# Patient Record
Sex: MALE | Race: WHITE | NOT HISPANIC OR LATINO | Employment: FULL TIME | URBAN - NONMETROPOLITAN AREA
[De-identification: names, ages, dates, MRNs, and addresses within clinical notes are randomized per-mention and may not be internally consistent; named-entity substitution may affect disease eponyms.]

---

## 2022-12-15 ENCOUNTER — APPOINTMENT (EMERGENCY)
Dept: RADIOLOGY | Facility: HOSPITAL | Age: 22
End: 2022-12-15

## 2022-12-15 ENCOUNTER — HOSPITAL ENCOUNTER (EMERGENCY)
Facility: HOSPITAL | Age: 22
Discharge: HOME | End: 2022-12-15
Attending: EMERGENCY MEDICINE

## 2022-12-15 VITALS
WEIGHT: 180 LBS | SYSTOLIC BLOOD PRESSURE: 147 MMHG | OXYGEN SATURATION: 99 % | DIASTOLIC BLOOD PRESSURE: 96 MMHG | BODY MASS INDEX: 26.66 KG/M2 | RESPIRATION RATE: 17 BRPM | HEART RATE: 88 BPM | TEMPERATURE: 98.4 F | HEIGHT: 69 IN

## 2022-12-15 DIAGNOSIS — M77.9 TENDINITIS: Primary | ICD-10-CM

## 2022-12-15 NOTE — Clinical Note
Kleber George was seen and treated in our emergency department on 12/15/2022  Diagnosis:     Hal Seek  may return to work on return date  He may return on this date: 12/19/2022    Not to use left hand until cleared by work physician     If you have any questions or concerns, please don't hesitate to call        Jami Wan, DO    ______________________________           _______________          _______________  Hospital Representative                              Date                                Time

## 2022-12-16 NOTE — DISCHARGE INSTRUCTIONS
Return immediately if worse or any new symptoms  Tylenol 1000 mg every 6 hours as needed  and/or  Advil 400 mg every 6 hours as needed  May take both together  Ice 20 minutes hourly as needed  Use splint until seen by work physician or sports medicine physician listed  Please arrange visit with work physician within 24 hours

## 2022-12-16 NOTE — ED PROVIDER NOTES
History  Chief Complaint   Patient presents with   • Arm Pain     Pt was lifting boxes and is now having left lower forearm pain and swelling  Ptlast took Tylenol at 0900 today with not much relief  36-year-old male describes left mid posterior distal forearm pain and swelling that began yesterday at work where he lifts heavy items, 100 pounds, stabilizes with left hand supinated  No prior injury or pain  No prior swelling  Unamenable to Tylenol this morning  No other complaints  History provided by:  Patient  Arm Pain  Location:  Left  Quality:  Ache, sharp  Onset quality:  Gradual  Timing:  Constant  Progression:  Unchanged  Chronicity:  New  Context:  Heavy lifting, stabilizing  Worsened by: Movement, palpation  Ineffective treatments:  Tylenol  Associated symptoms comment:  No numbness or weakness in left hand      None       History reviewed  No pertinent past medical history  History reviewed  No pertinent surgical history  History reviewed  No pertinent family history  I have reviewed and agree with the history as documented  E-Cigarette/Vaping   • E-Cigarette Use Never User      E-Cigarette/Vaping Substances     Social History     Tobacco Use   • Smoking status: Never   • Smokeless tobacco: Never   Vaping Use   • Vaping Use: Never used   Substance Use Topics   • Alcohol use: Never   • Drug use: Never       Review of Systems   All other systems reviewed and are negative  Physical Exam  Physical Exam  Vitals and nursing note reviewed  Constitutional:       Appearance: Normal appearance  Comments: Pleasant, comfortable-appearing   HENT:      Head: Normocephalic and atraumatic  Right Ear: External ear normal       Left Ear: External ear normal       Nose: Nose normal    Eyes:      Conjunctiva/sclera: Conjunctivae normal    Pulmonary:      Effort: Pulmonary effort is normal    Abdominal:      General: Abdomen is flat  Musculoskeletal:         General: No deformity  Cervical back: Neck supple  Comments: Left mid distal forearm, posteriorly brachial radialis area, mild swelling compared to opposite with crease comfort pronating and supinating, intact range of motion and strength distally at hand radial pulses 2+   Skin:     Comments: Good color   Neurological:      General: No focal deficit present  Mental Status: He is alert     Psychiatric:         Mood and Affect: Mood normal          Vital Signs  ED Triage Vitals [12/15/22 2128]   Temperature Pulse Respirations Blood Pressure SpO2   98 4 °F (36 9 °C) 88 17 147/96 99 %      Temp Source Heart Rate Source Patient Position - Orthostatic VS BP Location FiO2 (%)   Temporal Monitor Sitting Left arm --      Pain Score       --           Vitals:    12/15/22 2128   BP: 147/96   Pulse: 88   Patient Position - Orthostatic VS: Sitting         Visual Acuity      ED Medications  Medications - No data to display    Diagnostic Studies  Results Reviewed     None                 XR forearm 2 views LEFT   ED Interpretation by Mary Guerrero DO (12/15 2154)   No obvious fracture                 Procedures  Procedures         ED Course                                             MDM  Number of Diagnoses or Management Options  Tendinitis: new and requires workup  Diagnosis management comments: Work-related tendinitis will require close outpatient follow-up with work physician or sports medicine, splint and analgesics, voices good understanding and agreeable      Disposition  Final diagnoses:   Tendinitis - left brachioradialis     Time reflects when diagnosis was documented in both MDM as applicable and the Disposition within this note     Time User Action Codes Description Comment    12/15/2022  9:46 PM Brendan Neil Add [M77 9] Tendinitis     12/15/2022  9:47 PM Brendan Neil Modify [M77 9] Tendinitis left brachioradialis      ED Disposition     ED Disposition   Discharge    Condition   Stable    Date/Time   Thu Dec 15, 2022 9:46 PM    Comment   Janell Goodman discharge to home/self care  Follow-up Information     Follow up With Specialties Details Why Contact Info    Maritza Huitron MD Sports Medicine Schedule an appointment as soon as possible for a visit in 1 day schedule appointment as soon as possible [phone]  [phone]            There are no discharge medications for this patient  No discharge procedures on file      PDMP Review     None          ED Provider  Electronically Signed by           Roseann Kasper DO  12/15/22 7442

## 2023-07-08 ENCOUNTER — APPOINTMENT (EMERGENCY)
Dept: RADIOLOGY | Facility: HOSPITAL | Age: 23
End: 2023-07-08
Payer: OTHER MISCELLANEOUS

## 2023-07-08 ENCOUNTER — HOSPITAL ENCOUNTER (EMERGENCY)
Facility: HOSPITAL | Age: 23
Discharge: HOME | End: 2023-07-08
Attending: EMERGENCY MEDICINE
Payer: OTHER MISCELLANEOUS

## 2023-07-08 VITALS
SYSTOLIC BLOOD PRESSURE: 157 MMHG | HEART RATE: 76 BPM | RESPIRATION RATE: 16 BRPM | BODY MASS INDEX: 23.48 KG/M2 | OXYGEN SATURATION: 100 % | DIASTOLIC BLOOD PRESSURE: 86 MMHG | WEIGHT: 149.6 LBS | TEMPERATURE: 98.2 F | HEIGHT: 67 IN

## 2023-07-08 DIAGNOSIS — S00.33XA CONTUSION OF NOSE, INITIAL ENCOUNTER: Primary | ICD-10-CM

## 2023-07-08 PROCEDURE — 70160 X-RAY EXAM OF NASAL BONES: CPT

## 2023-07-08 PROCEDURE — 99283 EMERGENCY DEPT VISIT LOW MDM: CPT

## 2023-07-09 NOTE — ED PROVIDER NOTES
History  Chief Complaint   Patient presents with   • Nasal Injury     Yesterday while at work a gio handle broke off while pt was trying to lift it and hit him in the nose. Pt wants to make sure his nose isn't broken      Patient is a 27-year-old male, otherwise healthy, presenting to the emergency department complaining of persistent pain to his nose from an injury that occurred yesterday, states yesterday while he was at work the handle broke off of the gio he was trying to pull which caused him to accidentally punched himself in the nose with the handle from the gio, he had a slight bloody nose at the time but that resolved, he denies any loss of consciousness, no nausea or vomiting, does not take a blood thinner          None       Past Medical History:   Diagnosis Date   • Fracture of unsp part of unsp clavicle, init for clos fx        History reviewed. No pertinent surgical history. History reviewed. No pertinent family history. I have reviewed and agree with the history as documented. E-Cigarette/Vaping   • E-Cigarette Use Never User      E-Cigarette/Vaping Substances     Social History     Tobacco Use   • Smoking status: Never   • Smokeless tobacco: Never   Vaping Use   • Vaping Use: Never used   Substance Use Topics   • Alcohol use: Yes     Comment: socially   • Drug use: Never       Review of Systems   Constitutional: Negative. HENT:        Nose injury   Eyes: Negative. Respiratory: Negative. Cardiovascular: Negative. Gastrointestinal: Negative. Endocrine: Negative. Genitourinary: Negative. Musculoskeletal: Negative. Skin: Negative. Allergic/Immunologic: Negative. Neurological: Negative. Hematological: Negative. Psychiatric/Behavioral: Negative. Physical Exam  Physical Exam  Constitutional:       Appearance: He is well-developed. HENT:      Head: Normocephalic and atraumatic.         Comments: Slight swelling and tenderness at nasal bridge, no epistaxis, no septal hematoma, no open wounds  Eyes:      Conjunctiva/sclera: Conjunctivae normal.      Pupils: Pupils are equal, round, and reactive to light. Cardiovascular:      Rate and Rhythm: Normal rate. Pulmonary:      Effort: Pulmonary effort is normal.   Abdominal:      Palpations: Abdomen is soft. Musculoskeletal:         General: Normal range of motion. Cervical back: Normal range of motion and neck supple. Skin:     General: Skin is warm and dry. Neurological:      Mental Status: He is alert and oriented to person, place, and time. Vital Signs  ED Triage Vitals [07/08/23 2002]   Temperature Pulse Respirations Blood Pressure SpO2   98.2 °F (36.8 °C) 76 16 157/86 100 %      Temp Source Heart Rate Source Patient Position - Orthostatic VS BP Location FiO2 (%)   Temporal Monitor Sitting Right arm --      Pain Score       --           Vitals:    07/08/23 2002   BP: 157/86   Pulse: 76   Patient Position - Orthostatic VS: Sitting         Visual Acuity      ED Medications  Medications - No data to display    Diagnostic Studies  Results Reviewed     None                 XR nasal bones   ED Interpretation by Aileen Greene DO (07/08 2139)   No obvious displaced fracture                 Procedures  Procedures         ED Course  ED Course as of 07/09/23 0019   Sun Jul 09, 2023   0018 XR nasal bones                               SBIRT 20yo+    Flowsheet Row Most Recent Value   Initial Alcohol Screen: US AUDIT-C     1. How often do you have a drink containing alcohol? 1 Filed at: 07/08/2023 2004   2. How many drinks containing alcohol do you have on a typical day you are drinking? 2 Filed at: 07/08/2023 2004   3a. Male UNDER 65: How often do you have five or more drinks on one occasion? 0 Filed at: 07/08/2023 2004   Audit-C Score 3 Filed at: 07/08/2023 2004   LIGIA: How many times in the past year have you. .. Used an illegal drug or used a prescription medication for non-medical reasons? Never Filed at: 07/08/2023 2004                    Medical Decision Making  Patient is a 31-year-old male presenting for persistent pain to his nose from an injury that occurred yesterday, x-ray reveals no obvious displaced fracture or dislocation, there is slight swelling over the nasal bridge, consistent with a contusion, no epistaxis and no septal hematomas, patient advised to apply ice as needed, Tylenol or Motrin for pain, follow-up with PCP as needed or return if symptoms worsen, patient acknowledges understanding and agreement with this plan    Contusion of nose, initial encounter: acute illness or injury  Amount and/or Complexity of Data Reviewed  Radiology: ordered and independent interpretation performed. Risk  OTC drugs. Disposition  Final diagnoses:   Contusion of nose, initial encounter     Time reflects when diagnosis was documented in both MDM as applicable and the Disposition within this note     Time User Action Codes Description Comment    7/8/2023  9:39 PM Doris Banks Add [S00.33XA] Contusion of nose, initial encounter       ED Disposition     ED Disposition   Discharge    Condition   Stable    Date/Time   Sat Jul 8, 2023  9:39 PM    Comment   Rafi Goodman discharge to home/self care. Follow-up Information     Follow up With Specialties Details Why Contact Tarik Dangelo DO General Practice  As needed [address] [place] [address] [address]            There are no discharge medications for this patient. No discharge procedures on file.     PDMP Review     None          ED Provider  Electronically Signed by           Doris Banks DO  07/09/23 0734